# Patient Record
Sex: MALE | HISPANIC OR LATINO | URBAN - METROPOLITAN AREA
[De-identification: names, ages, dates, MRNs, and addresses within clinical notes are randomized per-mention and may not be internally consistent; named-entity substitution may affect disease eponyms.]

---

## 2017-07-20 ENCOUNTER — EMERGENCY (EMERGENCY)
Facility: HOSPITAL | Age: 27
LOS: 0 days | Discharge: HOME | End: 2017-07-20
Attending: EMERGENCY MEDICINE | Admitting: EMERGENCY MEDICINE
Payer: SUBSIDIZED

## 2017-07-20 VITALS
SYSTOLIC BLOOD PRESSURE: 122 MMHG | TEMPERATURE: 99 F | RESPIRATION RATE: 17 BRPM | OXYGEN SATURATION: 99 % | DIASTOLIC BLOOD PRESSURE: 75 MMHG | HEART RATE: 68 BPM

## 2017-07-20 VITALS — HEIGHT: 65 IN | WEIGHT: 130.07 LBS

## 2017-07-20 DIAGNOSIS — S05.02XA INJURY OF CONJUNCTIVA AND CORNEAL ABRASION WITHOUT FOREIGN BODY, LEFT EYE, INITIAL ENCOUNTER: ICD-10-CM

## 2017-07-20 DIAGNOSIS — S00.252A SUPERFICIAL FOREIGN BODY OF LEFT EYELID AND PERIOCULAR AREA, INITIAL ENCOUNTER: ICD-10-CM
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
## 2017-07-20 DIAGNOSIS — T15.02XA FOREIGN BODY IN CORNEA, LEFT EYE, INITIAL ENCOUNTER: ICD-10-CM

## 2017-07-20 DIAGNOSIS — L03.213 PERIORBITAL CELLULITIS: ICD-10-CM
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
## 2017-07-20 PROCEDURE — 99285 EMERGENCY DEPT VISIT HI MDM: CPT

## 2017-07-20 RX ORDER — FLUORESCEIN SODIUM 9 MG
1 STRIP OPHTHALMIC (EYE) ONCE
Qty: 0 | Refills: 0 | Status: COMPLETED | OUTPATIENT
Start: 2017-07-20 | End: 2017-07-20

## 2017-07-20 RX ORDER — CIPROFLOXACIN HCL 0.3 %
1 DROPS OPHTHALMIC (EYE) ONCE
Qty: 0 | Refills: 0 | Status: COMPLETED | OUTPATIENT
Start: 2017-07-20 | End: 2017-07-20

## 2017-07-20 RX ADMIN — Medication 1 APPLICATION(S): at 14:14

## 2017-07-20 RX ADMIN — Medication 1 TABLET(S): at 15:43

## 2017-07-20 RX ADMIN — Medication 1 DROP(S): at 15:49

## 2017-07-20 RX ADMIN — Medication 1 DROP(S): at 14:14

## 2017-07-20 NOTE — ED STATDOCS - CARE PLAN
Principal Discharge DX:	Preseptal cellulitis of left eye  Secondary Diagnosis:	Corneal abrasion  Secondary Diagnosis:	Conjunctivitis

## 2017-07-20 NOTE — ED STATDOCS - OBJECTIVE STATEMENT
25 y/o male presents to ED due to left eye pain. 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 He did not have it flushed out. C/o blurry vision. When he looks to the right, feels as if there is something in eye.

## 2017-07-20 NOTE — ED STATDOCS - MEDICAL DECISION MAKING DETAILS
No foreign body on exam of eye.  Slight stain uptake in conjunctiva.  Preseptal cellulitis of upper lid on exam.  Given antibiotics in ED, d/c with f/u with eye doctor or PCP in 1 week.  Return precautions given.

## 2017-07-20 NOTE — ED STATDOCS - PROGRESS NOTE DETAILS
26 yr. 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 Attempted to flush it out feels there is something in it. Seen and examined by attending in Intake. Plan: eye exam . Will F/u with patient and perform eye exam. Laurent [facility] Right Eye 20/25  Left Eye 20/50  Left eye : Tetracaine 0.5% 2 drops instilled into left eye , lid everted,  no FB seen , flouresein stain with + dye uptake  under slit lamp. Laurent NP 26 yr. 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 Attempted to flush it out feels there is something in it. Seen and examined by attending in Intake. Plan: eye exam . Will F/u with patient and perform eye exam. MTangredi NP      PE: HEENT PERRLA EOMS intact. Left eye mild swelling upper eyelid, + discharge from inner canthus. No FB seen with naked eye. Neck: non tender no lymphadenopathy, Chest: Lungs clear.  MTangredi NP

## 2017-07-20 NOTE — ED ADULT NURSE NOTE - CAS EDN DISCHARGE ASSESSMENT
Symptoms improved/Patient baseline mental status/Awake/No adverse reaction to first time med in ED/Alert and oriented to person, place and time

## 2025-04-17 NOTE — ED ADULT NURSE NOTE - HOW OFTEN DO YOU HAVE A DRINK CONTAINING ALCOHOL?
Patient called the RX Refill Line. Message is being forwarded to the office.     Patient was returning phone call I read message Uribel is not covered by insurance to continue pyridium. Patient stated been on pyridium for so long feels does not work as well any other suggestion      Please contact patient at [phone]     Monthly or less